# Patient Record
Sex: MALE | Race: WHITE | URBAN - METROPOLITAN AREA
[De-identification: names, ages, dates, MRNs, and addresses within clinical notes are randomized per-mention and may not be internally consistent; named-entity substitution may affect disease eponyms.]

---

## 2017-07-31 ENCOUNTER — HISTORICAL (OUTPATIENT)
Dept: ENDOSCOPY | Facility: HOSPITAL | Age: 52
End: 2017-07-31

## 2017-07-31 LAB — TROPONIN I SERPL-MCNC: <0.02 NG/ML (ref 0.02–0.49)

## 2017-08-03 ENCOUNTER — HISTORICAL (OUTPATIENT)
Dept: ENDOSCOPY | Facility: HOSPITAL | Age: 52
End: 2017-08-03

## 2022-04-30 NOTE — H&P
Patient:   Theodore Singh            MRN: [id number]            FIN: [id number]               Age:   52 years     Sex:  Male     :  1965   Associated Diagnoses:   None   Author:   RUFINO Tee MD      Chief Complaint   Screening colonoscopy      History of Present Illness   52-year-old individual who comes in for screening colonoscopy.  He knows of no family history of colon cancer or colon polyps.  He denies any rectal bleeding abdominal pain change in bowel habits.  He is on no anticoagulants.      Health Status   Allergies:    Allergic Reactions (Selected)  Severity Not Documented  Codeine- No reactions were documented.   Current medications:  (Selected)   Prescriptions  Prescribed  loratadine 10 mg oral tablet: 10 mg = 1 tab(s), Oral, Daily, # 10 tab(s), 0 Refill(s)  simvastatin 20 mg oral tablet: 20 mg = 1 tab(s), Oral, Once a day (at bedtime), # 30 tab(s), 11 Refill(s)  Documented Medications  Documented  ESOMEPRA MAG CAP 40MG DR: 40 mg = 1 cap(s), Oral, Daily  FLUOXETINE   CAP 10MG: 10 mg = 1 cap(s), Oral, qAM  FLUTICASONE  SPR 50MC spray(s), Both Nostrils, Daily  GABAPENTIN   CAP 300MG:   HYDROCO/APAP TAB 10-325M tab(s), Oral, QID  TIZANIDINE   TAB 4M mg = 1 tab(s), Oral, TID  XOPENEX HFA  AER: puff(s), INH      Histories   Past Medical History:    No active or resolved past medical history items have been selected or recorded.   Family History:    No family history items have been selected or recorded.   Procedure history:    NONE.  nose sx.   Social History        Social & Psychosocial Habits    Alcohol  2015 Risk Assessment: Denies Alcohol Use    2016  Use: Never    Substance Abuse  2015 Risk Assessment: Denies Substance Abuse    Tobacco  2013 Risk Assessment: High Risk    2015  Use: Current every day smoker    Type: Cigarettes    Tobacco use per day: 10    Ready to change: No  .        Physical Examination   General:  Alert and oriented.     Eye:  Pupils are equal, round and reactive to light.    HENT:  Normocephalic.    Neck:  Supple.    Respiratory:  Lungs are clear to auscultation.    Cardiovascular:  Normal rate.    Gastrointestinal:  Soft.       Vital Signs (last 24 hrs)_____  Last Charted___________  Heart Rate Peripheral   61 bpm  (JUL 31 08:39)  Resp Rate         14 br/min  (JUL 31 08:39)  SBP      121 mmHg  (JUL 31 08:39)  DBP      78 mmHg  (JUL 31 08:39)  SpO2      97 %  (JUL 31 08:39)        Impression and Plan   Adequate candidate for screening colonoscopy    RUFINO Tee M.D.

## 2022-04-30 NOTE — H&P
Patient:   Theodore Singh            MRN: [id number]            FIN: [id number]               Age:   52 years     Sex:  Male     :  1965   Associated Diagnoses:   None   Author:   RUFINO Tee MD      Chief Complaint   Recurrent heartburn      History of Present Illness   52-year-old individual who comes in for evaluation of some recurrent heartburn and some periods of regurgitation.  He has achieved partial relief with a proton pump inhibitor but his difficulty is not taking something for this.  He has some risk factors including smoking and a high fat diet and ultimately to his dyspepsia.  He denies any hematemesis melena or hematochezia.      Health Status   Allergies:    Allergic Reactions (Selected)  Severity Not Documented  Codeine- No reactions were documented.   Current medications:  (Selected)   Inpatient Medications  Ordered  Buffered Lidocaine 1% - 1mL syringe: 0.5 mL, 5 mg =, form: Injection, ID, As Directed PRN for other (see comment), first dose 17 7:44:00 CDT, May inject 0.5mL at IV site, if not allergic  Plasmalyte 1,000 mL: 1,000 mL, 1,000 mL, IV, 20 mL/hr, start date 17 7:44:00 CDT  midazolam: 2 mg, form: Injection, IV Push, q5min, Order duration: 2 dose(s), first dose 17 7:44:00 CDT, stop date 17 7:53:00 CDT, (up to 5 mg for moderate anxiety), 30,025  Prescriptions  Prescribed  loratadine 10 mg oral tablet: 10 mg = 1 tab(s), Oral, Daily, # 10 tab(s), 0 Refill(s)  simvastatin 20 mg oral tablet: 20 mg = 1 tab(s), Oral, Once a day (at bedtime), # 30 tab(s), 11 Refill(s)  Documented Medications  Documented  ESOMEPRA MAG CAP 40MG DR: 40 mg = 1 cap(s), Oral, Daily  FLUOXETINE   CAP 10MG: 10 mg = 1 cap(s), Oral, qAM  FLUTICASONE  SPR 50MC spray(s), Both Nostrils, Daily  GABAPENTIN   CAP 300MG:   HYDROCO/APAP TAB 10-325M tab(s), Oral, QID  TIZANIDINE   TAB 4M mg = 1 tab(s), Oral, TID  XOPENEX HFA  AER: puff(s), INH      Histories   Past Medical  History:    No active or resolved past medical history items have been selected or recorded.   Family History:    No family history items have been selected or recorded.   Procedure history:    Polypectomy on 7/31/2017 at 52 Years.  Comments:  7/31/2017 10:[facility] - Carlos Alberto Olmos RN  auto-populated from documented surgical case  Colonoscopy on 7/31/2017 at 52 Years.  Comments:  7/31/2017 10:Carlos Alberto Fierro RN  auto-populated from documented surgical case  NONE.  nose sx.   Social History        Social & Psychosocial Habits    Alcohol  06/12/2015 Risk Assessment: Denies Alcohol Use    12/25/2016  Use: Never    Substance Abuse  06/12/2015 Risk Assessment: Denies Substance Abuse    Tobacco  04/30/2013 Risk Assessment: High Risk    06/12/2015  Use: Current every day smoker    Type: Cigarettes    Tobacco use per day: 10    Ready to change: No  .        Physical Examination   Neck:  Supple.    Respiratory:  Few sporadic wheezes.    Cardiovascular:  Normal rate.       Vital Signs (last 24 hrs)_____  Last Charted___________  Temp Tympanic    36.8 DegC  (AUG 03 06:50)  Heart Rate Peripheral   62 bpm  (AUG 03 06:50)  Resp Rate         18 br/min  (AUG 03 06:50)  SBP      110 mmHg  (AUG 03 06:50)  DBP      69 mmHg  (AUG 03 06:50)  SpO2      97 %  (AUG 03 06:50)     Genitourinary:  No costovertebral angle tenderness.    Neurologic:  Alert.       Impression and Plan   Rule out significant hiatal hernia, Yañez's or peptic disease    RUFINO Tee M.D.